# Patient Record
Sex: FEMALE | Race: WHITE | Employment: STUDENT | URBAN - METROPOLITAN AREA
[De-identification: names, ages, dates, MRNs, and addresses within clinical notes are randomized per-mention and may not be internally consistent; named-entity substitution may affect disease eponyms.]

---
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
## 2025-02-17 ENCOUNTER — OFFICE VISIT (OUTPATIENT)
Dept: URGENT CARE | Facility: CLINIC | Age: 20
End: 2025-02-17
Payer: COMMERCIAL

## 2025-02-17 VITALS
SYSTOLIC BLOOD PRESSURE: 102 MMHG | OXYGEN SATURATION: 98 % | HEIGHT: 59 IN | DIASTOLIC BLOOD PRESSURE: 71 MMHG | RESPIRATION RATE: 19 BRPM | HEART RATE: 63 BPM | BODY MASS INDEX: 24.53 KG/M2 | TEMPERATURE: 99 F | WEIGHT: 121.69 LBS

## 2025-02-17 DIAGNOSIS — J20.9 ACUTE BRONCHITIS, UNSPECIFIED ORGANISM: Primary | ICD-10-CM

## 2025-02-17 DIAGNOSIS — R06.2 WHEEZING: ICD-10-CM

## 2025-02-17 DIAGNOSIS — R52 BODY ACHES: ICD-10-CM

## 2025-02-17 DIAGNOSIS — H10.33 ACUTE BACTERIAL CONJUNCTIVITIS OF BOTH EYES: ICD-10-CM

## 2025-02-17 LAB
CTP QC/QA: YES
CTP QC/QA: YES
POC MOLECULAR INFLUENZA A AGN: NEGATIVE
POC MOLECULAR INFLUENZA B AGN: NEGATIVE
SARS CORONAVIRUS 2 ANTIGEN: NEGATIVE

## 2025-02-17 RX ORDER — PREDNISONE 20 MG/1
40 TABLET ORAL DAILY
Qty: 8 TABLET | Refills: 0 | Status: SHIPPED | OUTPATIENT
Start: 2025-02-18 | End: 2025-02-22

## 2025-02-17 RX ORDER — OFLOXACIN 3 MG/ML
1 SOLUTION/ DROPS OPHTHALMIC 4 TIMES DAILY
Qty: 10 ML | Refills: 0 | Status: SHIPPED | OUTPATIENT
Start: 2025-02-17 | End: 2025-02-24

## 2025-02-17 RX ORDER — ALBUTEROL SULFATE 90 UG/1
2 INHALANT RESPIRATORY (INHALATION) EVERY 6 HOURS PRN
Qty: 18 G | Refills: 0 | Status: SHIPPED | OUTPATIENT
Start: 2025-02-17 | End: 2026-02-17

## 2025-02-17 NOTE — PROGRESS NOTES
"Subjective:      Patient ID: Emmie Jain is a 19 y.o. female.    Vitals:  height is 4' 11" (1.499 m) and weight is 55.2 kg (121 lb 11.1 oz). Her oral temperature is 98.9 °F (37.2 °C). Her blood pressure is 102/71 and her pulse is 63. Her respiration is 19 and oxygen saturation is 98%.     Chief Complaint: Eye Problem    This is a 19 y.o. female who presents today with a chief complaint of redness and itching in both eyes that started this morning. Pt states she hasn't taken any medication to resolve this issue.    Eye Problem   Both eyes are affected. This is a new problem. The current episode started today. The problem occurs constantly. The problem has been rapidly worsening. The injury mechanism is unknown. The pain is at a severity of 0/10. The patient is experiencing no pain. There is No known exposure to pink eye. She Does not wear contacts. Associated symptoms include eye redness and itching. She has tried nothing for the symptoms.       Eyes:  Positive for eye itching and eye redness.      Pt states this past weekend she went to a sorority retreat [place] which was [place] and "nasty" per student.  States she was sick before she went but got a lot worse once she was there and exposed to a lot of different elements.    Objective:     Physical Exam   Constitutional: She is oriented to person, place, and time.  Non-toxic appearance. She does not appear ill. No distress.   HENT:   Head: Normocephalic and atraumatic.   Ears:   Right Ear: Tympanic membrane normal.   Left Ear: Tympanic membrane normal.   Nose: Rhinorrhea and congestion present.   Mouth/Throat: Mucous membranes are moist. Oropharynx is clear.   Eyes: Pupils are equal, round, and reactive to light. Right eye exhibits discharge (green). Left eye exhibits discharge (green). Right conjunctiva is injected. Left conjunctiva is injected. Extraocular movement intact   Neck: Neck supple.   Cardiovascular: Normal rate, regular " rhythm, normal heart sounds and normal pulses.   Pulmonary/Chest: Effort normal. No respiratory distress. She has wheezes in the right upper field.         Comments: Frequent dry cough noted    Abdominal: Normal appearance.   Musculoskeletal: Normal range of motion.         General: Normal range of motion.   Neurological: no focal deficit. She is alert and oriented to person, place, and time.   Skin: Skin is warm, dry and not diaphoretic.   Psychiatric: Her behavior is normal. Mood normal.     Results for orders placed or performed in visit on 02/17/25   POCT Influenza A/B MOLECULAR    Collection Time: 02/17/25  2:20 PM   Result Value Ref Range    POC Molecular Influenza A Ag Negative Negative    POC Molecular Influenza B Ag Negative Negative     Acceptable Yes    SARS Coronavirus 2 Antigen, POCT Manual Read    Collection Time: 02/17/25  2:21 PM   Result Value Ref Range    SARS Coronavirus 2 Antigen Negative Negative, Presumptive Negative     Acceptable Yes          Assessment:     1. Acute bronchitis, unspecified organism    2. Wheezing    3. Acute bacterial conjunctivitis of both eyes    4. Body aches        Plan:       Acute bronchitis, unspecified organism  -     albuterol (VENTOLIN HFA) 90 mcg/actuation inhaler; Inhale 2 puffs into the lungs every 6 (six) hours as needed for Wheezing or Shortness of Breath (cough). Rescue.  2 puffs in the morning and 2 puffs at night and as needed every 6 hours  Dispense: 18 g; Refill: 0  -     predniSONE (DELTASONE) 20 MG tablet; Take 2 tablets (40 mg total) by mouth once daily. for 4 days  Dispense: 8 tablet; Refill: 0    Wheezing  -     SARS Coronavirus 2 Antigen, POCT Manual Read  -     POCT Influenza A/B MOLECULAR  -     albuterol (VENTOLIN HFA) 90 mcg/actuation inhaler; Inhale 2 puffs into the lungs every 6 (six) hours as needed for Wheezing or Shortness of Breath (cough). Rescue.  2 puffs in the morning and 2 puffs at night and as needed  every 6 hours  Dispense: 18 g; Refill: 0  -     predniSONE (DELTASONE) 20 MG tablet; Take 2 tablets (40 mg total) by mouth once daily. for 4 days  Dispense: 8 tablet; Refill: 0    Acute bacterial conjunctivitis of both eyes  -     ofloxacin (OCUFLOX) 0.3 % ophthalmic solution; Place 1 drop into both eyes 4 (four) times daily. for 7 days  Dispense: 10 mL; Refill: 0    Body aches  -     SARS Coronavirus 2 Antigen, POCT Manual Read  -     POCT Influenza A/B MOLECULAR      Pt refused a neb treatment in clinic because she states she has to get to work and has to be on time.  Instructed her how to use the inhaler before she left since she has never used one before.

## 2025-02-17 NOTE — LETTER
February 17, 2025    Emmie Jain  [address] [facility] [address]             Urgent Care - [facility]  Urgent Care  [address] [facility] [address]  Phone: [phone]  Fax: [phone]   February 17, 2025     Patient: Emmie Jain   YOB: 2005   Date of Visit: 2/17/2025       To Whom it May Concern:    Emmie Jain was seen in my clinic on 2/17/2025. She may return to school on 02/18/25 .    Please excuse her from any classes or work missed.    If you have any questions or concerns, please don't hesitate to call.    Sincerely,         Rosanna Cruz, NP

## 2025-05-27 ENCOUNTER — TELEPHONE (OUTPATIENT)
Dept: INTERNAL MEDICINE | Facility: CLINIC | Age: 20
End: 2025-05-27
Payer: COMMERCIAL

## 2025-05-28 ENCOUNTER — OFFICE VISIT (OUTPATIENT)
Dept: INTERNAL MEDICINE | Facility: CLINIC | Age: 20
End: 2025-05-28
Payer: COMMERCIAL

## 2025-05-28 ENCOUNTER — PATIENT MESSAGE (OUTPATIENT)
Dept: INTERNAL MEDICINE | Facility: CLINIC | Age: 20
End: 2025-05-28

## 2025-05-28 VITALS
WEIGHT: 122.56 LBS | HEART RATE: 76 BPM | DIASTOLIC BLOOD PRESSURE: 62 MMHG | HEIGHT: 59 IN | BODY MASS INDEX: 24.71 KG/M2 | SYSTOLIC BLOOD PRESSURE: 110 MMHG | OXYGEN SATURATION: 99 %

## 2025-05-28 DIAGNOSIS — Z00.00 ENCOUNTER FOR ANNUAL PHYSICAL EXAM: Primary | ICD-10-CM

## 2025-05-28 PROCEDURE — 99999 PR PBB SHADOW E&M-EST. PATIENT-LVL III: CPT | Mod: PBBFAC,,, | Performed by: STUDENT IN AN ORGANIZED HEALTH CARE EDUCATION/TRAINING PROGRAM

## 2025-05-28 NOTE — LETTER
May 28, 2025      [facility]  [address]  [facility] [address]  Phone: [phone]  Fax: [phone]       Patient: Emmie Jain   YOB: 2005  Date of Visit: 05/28/2025                                   Bilingual Medical certificate of good health    This is to certify that Ms.Caroline Jain does not suffer from any disease that could cause serious repercussions to public health according to the specifications of the International Health Regulations of 2005.    Se certifica que la Sra. Emmie Jain no padece ninguna enfermedad que pueda causar repercusiones graves para la geo pública, de acuerdo con lo establecido en el Reglamento Sanitario Internacional de 2005.      Sincerely,      Alivia Becker MD  License number: [id number]  28th May 2025

## 2025-05-28 NOTE — PROGRESS NOTES
"Patient ID: Emmie Jain is a 20 y.o. female.  Chief Complaint: Establish Care    Subjective:   History of Present Illness    CHIEF COMPLAINT:  Patient presents today for physical exam required for [place] visa.  Going to [place] in September for 3 months to do electives in political science.  She does not have any complaints.    SOCIAL HISTORY:  She consumes alcohol twice weekly during school, but not in summer, averaging 2-4 drinks per night. She occasionally vapes nicotine socially. She denies marijuana use and has never smoked cigarettes.      FAMILY HISTORY:  Her maternal grandmother had colon cancer and maternal grandfather had skin cancer of unknown type.    REPRODUCTIVE HEALTH:  She has Nexplanon implant for contraception and management of heavy menstrual periods. She reports being previously sexually active but is not currently active.      ROS:  General: -fever, -chills, -fatigue, -weight gain, -weight loss  Eyes: -vision changes, -redness, -discharge  ENT: -ear pain, -nasal congestion, -sore throat  Cardiovascular: -chest pain, -palpitations, -lower extremity edema  Respiratory: -cough, -shortness of breath  Gastrointestinal: -abdominal pain, -nausea, -vomiting, -diarrhea, -constipation, -blood in stool  Genitourinary: -dysuria, -hematuria, -frequency  Musculoskeletal: -joint pain, -muscle pain  Skin: -rash, -lesion  Neurological: -headache, -dizziness, -numbness, -tingling  Psychiatric: -anxiety, -depression, -sleep difficulty              Objective:   /62 (BP Location: Left arm, Patient Position: Sitting)   Pulse 76   Ht 4' 11" (1.499 m)   Wt 55.6 kg (122 lb 9.2 oz)   LMP 05/27/2025 (Exact Date)   SpO2 99%   BMI 24.76 kg/m²      Physical Exam    General: No acute distress. Well-developed. Well-nourished.  Eyes: EOMI. Sclerae anicteric.  HENT: Normocephalic. Atraumatic. Nares patent. Moist oral mucosa.  Ears: Bilateral TMs clear. Bilateral EACs clear.  Cardiovascular: Regular rate. Regular " rhythm. No murmurs. No rubs. No gallops. Normal S1, S2.  Respiratory: Normal respiratory effort. Clear to auscultation bilaterally. No rales. No rhonchi. No wheezing.  Abdomen: Soft. Non-tender. Non-distended.   Musculoskeletal: No  obvious deformity.  Extremities: No lower extremity edema.  Neurological: Alert. No slurred speech. Normal gait.  Psychiatric: Normal mood. Normal affect. Good insight. Good judgment.  Skin: Warm. Dry. No rash.           Assessment:       1. Encounter for annual physical exam        Plan:         1. Encounter for annual physical exam    - Conducted annual physical exam for patient requiring documentation for [place] visa application:  Letter signed and given to the patient.   - Reviewed medical history, noting no significant personal or family medical conditions except for grandparents' history of colon and skin cancer.  - She consumes alcohol twice weekly during school, but not in summer, averaging 2-4 drinks per night. She occasionally vapes nicotine socially. She denies marijuana use and has never smoked cigarettes.  -  discussed healthy lifestyle incorporating exercise and minimizing junk food.  - has Nexplanon for birth control and heavy menstrual periods.   - Considered ordering routine labs as part of annual exam, but patient declined at this time.    Health Maintenance     Health Maintenance Due   Topic    Lipid Panel         Follow up   Follow up in about 1 year (around 5/28/2026).      This note was generated with the assistance of ambient listening technology. Verbal consent was obtained by the patient and accompanying visitor(s) for the recording of patient appointment to facilitate this note. I attest to having reviewed and edited the generated note for accuracy, though some syntax or spelling errors may persist. Please contact the author of this note for any clarification.           Alivia Bekcer MD  [address] [place] [address]  Ph: [phone]

## 2025-05-28 NOTE — LETTER
May 28, 2025      [facility]  [address]  [facility] [address]  Phone: [phone]  Fax: [phone]       Patient: Emmie Jain   YOB: 2005  Date of Visit: 05/28/2025                                                        Medical certificate of good health    This is to certify that Ms.Caroline Jain does not suffer from any disease that could cause serious repercussions to public health according to the specifications of the International Health Regulations of 2005.      Sincerely,      Alivia Becker MD  License Number: [id number]  28/05/2025